# Patient Record
Sex: MALE | Race: WHITE | Employment: OTHER | URBAN - METROPOLITAN AREA
[De-identification: names, ages, dates, MRNs, and addresses within clinical notes are randomized per-mention and may not be internally consistent; named-entity substitution may affect disease eponyms.]

---

## 2022-06-22 ENCOUNTER — OFFICE VISIT (OUTPATIENT)
Dept: GASTROENTEROLOGY | Facility: CLINIC | Age: 77
End: 2022-06-22

## 2022-06-22 ENCOUNTER — TELEPHONE (OUTPATIENT)
Dept: GASTROENTEROLOGY | Facility: CLINIC | Age: 77
End: 2022-06-22

## 2022-06-22 DIAGNOSIS — R19.5 DARK STOOLS: ICD-10-CM

## 2022-06-22 DIAGNOSIS — D64.9 ANEMIA, UNSPECIFIED TYPE: Primary | ICD-10-CM

## 2022-06-22 PROBLEM — J44.9 COPD (CHRONIC OBSTRUCTIVE PULMONARY DISEASE): Status: ACTIVE | Noted: 2022-06-22

## 2022-06-22 PROBLEM — M06.9 RHEUMATOID ARTHRITIS: Status: ACTIVE | Noted: 2022-06-22

## 2022-06-22 PROBLEM — K21.9 GERD (GASTROESOPHAGEAL REFLUX DISEASE): Status: ACTIVE | Noted: 2022-06-22

## 2022-06-22 PROBLEM — M19.90 OSTEOARTHRITIS: Status: ACTIVE | Noted: 2022-06-22

## 2022-06-22 PROBLEM — I25.10 CAD (CORONARY ARTERY DISEASE): Status: ACTIVE | Noted: 2022-06-22

## 2022-06-22 PROBLEM — M05.20 RHEUMATOID ARTERITIS: Status: RESOLVED | Noted: 2022-06-22 | Resolved: 2022-06-22

## 2022-06-22 PROBLEM — M05.20 RHEUMATOID ARTERITIS: Status: ACTIVE | Noted: 2022-06-22

## 2022-06-22 PROBLEM — E11.9 DM (DIABETES MELLITUS): Status: ACTIVE | Noted: 2022-06-22

## 2022-06-22 PROCEDURE — 99203 OFFICE O/P NEW LOW 30 MIN: CPT | Performed by: NURSE PRACTITIONER

## 2022-06-22 RX ORDER — FOLIC ACID 1 MG/1
TABLET ORAL
COMMUNITY

## 2022-06-22 RX ORDER — GLIPIZIDE 5 MG/1
TABLET ORAL
COMMUNITY
Start: 2022-04-01

## 2022-06-22 RX ORDER — ALENDRONATE SODIUM 70 MG/1
TABLET ORAL
COMMUNITY

## 2022-06-22 RX ORDER — CLOPIDOGREL BISULFATE 75 MG/1
TABLET ORAL
COMMUNITY
Start: 2022-05-17

## 2022-06-22 RX ORDER — MECLIZINE HYDROCHLORIDE 25 MG/1
TABLET ORAL
COMMUNITY
Start: 2022-05-09

## 2022-06-22 RX ORDER — OXYCODONE AND ACETAMINOPHEN 10; 325 MG/1; MG/1
TABLET ORAL
COMMUNITY
Start: 2022-05-23

## 2022-06-22 RX ORDER — ATORVASTATIN CALCIUM 40 MG/1
TABLET, FILM COATED ORAL
COMMUNITY
Start: 2022-04-11

## 2022-06-22 RX ORDER — CARVEDILOL 25 MG/1
TABLET ORAL
COMMUNITY
Start: 2022-03-25

## 2022-06-22 RX ORDER — ALBUTEROL SULFATE 90 UG/1
AEROSOL, METERED RESPIRATORY (INHALATION)
COMMUNITY

## 2022-06-22 RX ORDER — LISINOPRIL 20 MG/1
TABLET ORAL
COMMUNITY
Start: 2022-06-07

## 2022-06-22 RX ORDER — PREDNISONE 10 MG/1
TABLET ORAL
COMMUNITY

## 2022-06-22 RX ORDER — ESOMEPRAZOLE MAGNESIUM 40 MG/1
CAPSULE, DELAYED RELEASE ORAL
COMMUNITY
End: 2022-06-22

## 2022-06-22 RX ORDER — AMLODIPINE BESYLATE 5 MG/1
TABLET ORAL
COMMUNITY
Start: 2022-06-20

## 2022-06-22 RX ORDER — OMEPRAZOLE 20 MG/1
20 CAPSULE, DELAYED RELEASE ORAL 2 TIMES DAILY
COMMUNITY
Start: 2022-04-02

## 2022-06-22 RX ORDER — LEVOTHYROXINE SODIUM 112 UG/1
TABLET ORAL
COMMUNITY
Start: 2022-04-21

## 2022-06-22 RX ORDER — INFLIXIMAB-DYYB 100 MG/10ML
INJECTION, POWDER, LYOPHILIZED, FOR SOLUTION INTRAVENOUS
COMMUNITY
Start: 2022-06-06

## 2022-06-22 NOTE — PROGRESS NOTES
New Patient Consultation     Patient Name: Alexei Kim  : 1945   MRN: [id number]     Chief Complaint:  No chief complaint on file.      History of Present Illness: Alexei Kim is a 76 y.o. male who is here today with PMH of RA, OA, osteopenia, insomnia, DM,GERD,HLD, COPD, and CAD/MI for a Gastroenterology Consultation for anemia.    Alexei is here today for anemia.  Had labs done at rheumatologist that showed normocytic anemia with a hemoglobin of 11.5 and hematocrit 35.8.  His occult stool was negative.  His last colonoscopy was about 10 years ago. He does have history of colon polyps.  His GERD is controlled.  He notes dark stool.  No bright red blood in the stool.    Denies abdominal pain, nausea, dysphagia. .  Has mostly normal bms with occasional constipation.  This is not a change.     He is on Plavix. History of CAD with CABG in 2018. PPM placed in 2021. Followed by Dr. Lowry.    There is no family history of colon cancer.    Abdominal surgical history includes cholecystectomy and hernia repair  Subjective      Review of Systems:   Review of Systems   Constitutional: Negative for activity change, appetite change, chills, diaphoresis, fatigue, fever, unexpected weight gain and unexpected weight loss.   HENT: Negative for trouble swallowing.    Gastrointestinal: Positive for constipation. Negative for abdominal distention, abdominal pain, anal bleeding, blood in stool, diarrhea, nausea, rectal pain, vomiting, GERD and indigestion.        Dark stools   Hematological: Bruises/bleeds easily.       Past Medical History:   Past Medical History:   Diagnosis Date   • Hyperlipidemia    • Hypertension        Past Surgical History:   Past Surgical History:   Procedure Laterality Date   • CHOLECYSTECTOMY     • COLONOSCOPY     • HERNIA REPAIR         Family History:   Family History   Problem Relation Age of Onset   • Colon cancer Neg Hx    • Colon polyps Neg Hx    • Esophageal cancer Neg Hx         Social History:   Social History     Socioeconomic History   • Marital status: Unknown   Tobacco Use   • Smoking status: Never Smoker   • Smokeless tobacco: Never Used   Vaping Use   • Vaping Use: Never used   Substance and Sexual Activity   • Drug use: Never       Alcohol/Tobacco History:   Social History     Substance and Sexual Activity   Alcohol Use None     Social History     Tobacco Use   Smoking Status Never Smoker   Smokeless Tobacco Never Used       Medications:     Current Outpatient Medications:   •  albuterol sulfate  (90 Base) MCG/ACT inhaler, ProAir HFA 90 mcg/actuation aerosol inhaler, Disp: , Rfl:   •  amLODIPine (NORVASC) 5 MG tablet, , Disp: , Rfl:   •  Aspirin 81 MG capsule, aspirin, Disp: , Rfl:   •  atorvastatin (LIPITOR) 40 MG tablet, , Disp: , Rfl:   •  clopidogrel (PLAVIX) 75 MG tablet, , Disp: , Rfl:   •  folic acid (FOLVITE) 1 MG tablet, folic acid 1 mg tablet  Daily, Disp: , Rfl:   •  glipizide (GLUCOTROL) 5 MG tablet, , Disp: , Rfl:   •  inFLIXimab-dyyb (Inflectra) 100 MG injection, Administer INFLECTRA 800mg IV every 5 weeks, Disp: , Rfl:   •  levothyroxine (SYNTHROID, LEVOTHROID) 112 MCG tablet, , Disp: , Rfl:   •  lisinopril (PRINIVIL,ZESTRIL) 20 MG tablet, , Disp: , Rfl:   •  meclizine (ANTIVERT) 25 MG tablet, , Disp: , Rfl:   •  metFORMIN (GLUCOPHAGE) 1000 MG tablet, , Disp: , Rfl:   •  omeprazole (priLOSEC) 20 MG capsule, Take 20 mg by mouth 2 (Two) Times a Day., Disp: , Rfl:   •  oxyCODONE-acetaminophen (PERCOCET)  MG per tablet, , Disp: , Rfl:   •  alendronate (FOSAMAX) 70 MG tablet, Fosamax 70 mg tablet, Disp: , Rfl:   •  carvedilol (COREG) 25 MG tablet, , Disp: , Rfl:   •  predniSONE (DELTASONE) 10 MG tablet, prednisone 10 mg tablet  Daily, Disp: , Rfl:     Allergies:   No Known Allergies    Objective     Physical Exam:  Vital Signs: There were no vitals filed for this visit.  There is no height or weight on file to calculate BMI.     Physical  Exam  Neurological:      Mental Status: He is oriented to person, place, and time.   Psychiatric:         Mood and Affect: Mood normal.         Thought Content: Thought content normal.         Assessment / Plan      Assessment/Plan:   Diagnoses and all orders for this visit:    1. Anemia, unspecified type (Primary)  -     CBC & Differential; Future  -     Iron Profile; Future  -     Vitamin B12 & Folate; Future  -     Ambulatory Referral For Screening Colonoscopy  -     Ambulatory referral for Screening EGD    2. Dark stools  -     Ambulatory referral for Screening EGD     recommend pan-endoscopy and anemia labs.    Will get clearance from cardiology to hold plavix  Follow Up:   No follow-ups on file.    Plan of care reviewed with the patient at the conclusion of today's visit.  Education was provided regarding diagnosis, management, and any prescribed or recommended OTC medications.  Patient verbalized understanding of and agreement with management plan.     Time Statement:   Discussed plan of care in detail with patient today. Patient verbally understands and agrees. I have spent 30 minutes reviewing available diagnostics, obtaining history, examining the patient, developing a treatment plan, and educating the patient on disease process and plan of care.    JOSE Jang  [facility]

## 2022-06-22 NOTE — TELEPHONE ENCOUNTER
PT IS SCHEDULED FOR A DOUBLE ON 7/27 AND NEEDS A CARDIAC CLEARANCE FROM DR KRISTIE ORTIZ P# [phone] F# [phone].

## 2022-06-23 NOTE — TELEPHONE ENCOUNTER
I TRIED TO CALL MR [facility] TO GIVE CARDIAC CLEARANCE APPROVAL INFORMATION. NO ANSWER; LEFT VOICE MESSAGE FOR PATIENT TO CALL US BACK.

## 2022-07-13 DIAGNOSIS — Z12.11 COLON CANCER SCREENING: Primary | ICD-10-CM

## 2022-07-13 RX ORDER — SODIUM, POTASSIUM,MAG SULFATES 17.5-3.13G
1 SOLUTION, RECONSTITUTED, ORAL ORAL TAKE AS DIRECTED
Qty: 354 ML | Refills: 0 | Status: SHIPPED | OUTPATIENT
Start: 2022-07-13

## 2022-07-27 ENCOUNTER — OUTSIDE FACILITY SERVICE (OUTPATIENT)
Dept: GASTROENTEROLOGY | Facility: CLINIC | Age: 77
End: 2022-07-27